# Patient Record
Sex: MALE | Race: WHITE | URBAN - METROPOLITAN AREA
[De-identification: names, ages, dates, MRNs, and addresses within clinical notes are randomized per-mention and may not be internally consistent; named-entity substitution may affect disease eponyms.]

---

## 2017-02-23 ENCOUNTER — APPOINTMENT (RX ONLY)
Dept: URBAN - METROPOLITAN AREA CLINIC 347 | Facility: CLINIC | Age: 22
Setting detail: DERMATOLOGY
End: 2017-02-23

## 2017-02-23 DIAGNOSIS — Z79.899 OTHER LONG TERM (CURRENT) DRUG THERAPY: ICD-10-CM

## 2017-02-23 DIAGNOSIS — L70.0 ACNE VULGARIS: ICD-10-CM | Status: IMPROVED

## 2017-02-23 PROCEDURE — ? COUNSELING

## 2017-02-23 PROCEDURE — 99213 OFFICE O/P EST LOW 20 MIN: CPT

## 2017-02-23 PROCEDURE — ? PRESCRIPTION

## 2017-02-23 PROCEDURE — ? ISOTRETINOIN MONITORING

## 2017-02-23 PROCEDURE — ? OTHER

## 2017-02-23 PROCEDURE — ? TREATMENT REGIMEN

## 2017-02-23 PROCEDURE — ? ORDER TESTS

## 2017-02-23 RX ORDER — ISOTRETINOIN 40 MG/1
CAPSULE, LIQUID FILLED ORAL
Qty: 60 | Refills: 0 | Status: ERX

## 2017-02-23 ASSESSMENT — LOCATION ZONE DERM: LOCATION ZONE: FACE

## 2017-02-23 ASSESSMENT — LOCATION DETAILED DESCRIPTION DERM
LOCATION DETAILED: RIGHT INFERIOR FOREHEAD
LOCATION DETAILED: LEFT MEDIAL FOREHEAD
LOCATION DETAILED: RIGHT MEDIAL FOREHEAD

## 2017-02-23 ASSESSMENT — LOCATION SIMPLE DESCRIPTION DERM
LOCATION SIMPLE: RIGHT FOREHEAD
LOCATION SIMPLE: LEFT FOREHEAD

## 2017-02-23 NOTE — PROCEDURE: ISOTRETINOIN MONITORING
Most Recent Lfts (Optional): WNL as of 12/23/16
Dosing Month 2 (Required For Cumulative Dosing): 80mg BID
Most Recent Cholesterol (Optional): 135 as of 12/23/16
Months Of Therapy Completed: 2
Detail Level: Zone
Pounds Preamble Statement (Weight Entered In Details Tab): Reported Weight in pounds:
Display Individual Monthly Dosage In The Note (If Yes Will Display All Dosages Which Are Not N/A): yes
Most Recent Triglycerides (Optional): 83 as of 12/23/16
Weight Units: pounds

## 2017-02-23 NOTE — PROCEDURE: OTHER
Other (Free Text): Patient will be going INTO 2nd month.\\nPatient to have labs drawn for next month, if labs are WNL for next month, pt will not need further labs.
Note Text (......Xxx Chief Complaint.): This diagnosis correlates with the
Detail Level: Detailed

## 2017-02-23 NOTE — PROCEDURE: TREATMENT REGIMEN
Initiate Treatment: Recommended Elta lip balm when pt is outside.  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  He is already red today after 1 month of treatment.\\nRecommended Skin Medica everyday clear for everyday use, especially before lacrosse practice Initiate Treatment: Recommended Elta lip balm when pt is outside.  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  He is already red today after 1 month of treatment.\\nRecommended Skin Medica everyday clear for everyday use, especially before lacrosse practice
